# Patient Record
(demographics unavailable — no encounter records)

---

## 2025-01-08 NOTE — HISTORY OF PRESENT ILLNESS
[de-identified] : KACEY UMANZOR 73 year old male presents for evaluation of left knee OA he completed series of Gelsyn injection #3 in July did not help. He does take Motrin 200mg daily which feels does help but would like to discuss other option. pt did have right TKR currently doing well but wants conservative treatment with left knee at this time. He did have a Cortisone injection on right knee no help inquiries about PT.

## 2025-01-08 NOTE — DISCUSSION/SUMMARY
[de-identified] : Discussed at length the nature of the patient's condition. Their left knee symptoms appear secondary to degenerative arthritis. We reviewed operative and nonoperative treatment. While I believe he would eventually benefit from a TKR, we will avoid surgery at this time. Therefore, we will continue nonoperatively. The patient did decline injection because it gave no relief, therefore he will continue with Motrin. I have also suggested he continue PT and weight loss. They can continue activities as tolerated.  I will see him back in 3-4 months with x-rays.

## 2025-01-08 NOTE — PHYSICAL EXAM
[de-identified] : General appearance: well nourished and hydrated, pleasant, alert and oriented x 3, cooperative. HEENT: Normocephalic, EOM intact, Nasal septum midline, Oral cavity clear, External auditory canal clear. Cardiovascular: no apparent abnormalities, no lower leg edema, no varicosities, pedal pulses are palpable. Lymphatics Lymph nodes: none palpated, Lymphedema: not present. Neurologic: sensation is normal, no muscle weakness in upper or lower extremities, patella tendon reflexes intact . Dermatologic no apparent skin lesions, moist, warm, no rash. Spine:cervical spine appears normal and moves freely, thoracic spine appears normal and moves freely, lumbosacral spine appears normal and moves freely. Gait: nonantalgic.  Left Knee Inspection: trace effusion Wounds: none. Alignment: 5 degrees varus, 5 degree flexion contracture  Palpation: medial tenderness on palpation. ROM: Active (in degrees): 5-130 with crepitus and discomfort Ligamentous laxity (neg): all ligaments appear stable, negative ant. drawer test, negative post. drawer test, stable to varus stress test, stable to valgus stress test, negative Lachman's test, negative pivot shift test, Meniscal Test: negative McMurrays, negative Stephen. Patellofemoral Alignment Test: Q angle-, normal. Muscle Test: good quad strength. Leg examination: calf is soft and non-tender.	  [de-identified] : Left knee x-rays, standing AP/Lateral and Merchant films, and 45-degree PA standing view, taken at the office today shows diffuse tricompartmental degenerative arthritis, mild varus deformity, medial joint space narrowing, medial bone on bone sclerosis, patellofemoral joint space narrowing, small osteophytes, Kellgren and Hermilo grade 4   Right knee x-ray merchant view taken at the office today demonstrates a total knee replacement with the patella in a central position.

## 2025-01-08 NOTE — ADDENDUM
[FreeTextEntry1] : This note was written by Anant Zambrano on 01/08/2025 acting as scribe for Dr. Juan Joyce M.D.   I, Dr. Juan Joyce, have read and attest that all the information, medical decision making and discharge instructions within are true and accurate.

## 2025-01-31 NOTE — LETTER BODY
[FreeTextEntry2] : Kaley Jean MD [address] [place] [id number] [FreeTextEntry3] : Sincerely,      Isai Chavez MD, FACS Director of Urology Services, [facility] Chief of Urology, [facility]  of Urology   [facility], [place] [id number] P: [phone] F: [phone] [facility]

## 2025-01-31 NOTE — LETTER BODY
[FreeTextEntry2] : Kaley Jean MD [address] [place] [id number] [FreeTextEntry3] : Sincerely,      Isai Chavez MD, FACS Director of Urology Services, [facility] Chief of Urology, [facility]  of Urology   [facility], [place] [id number] P: [phone] F: [phone] [facility]

## 2025-01-31 NOTE — HISTORY OF PRESENT ILLNESS
[FreeTextEntry1] : Matias Long returns to the office for follow-up on BPH with associated lower urinary tract symptoms.  Treatment thus far has included finasteride and tamsulosin dual therapy.  He has gotten very good results with this combination therapy.  He reports no symptomatic progression and remains with good urinary patterns without any issues of urgency or frequency.  No incontinence.  No significant nocturia.  PSA level in February 2023 was 0.9 ng/mL (on finasteride).

## 2025-01-31 NOTE — LETTER BODY
[FreeTextEntry2] : Kaley Jean MD [address] [place] [id number] [FreeTextEntry3] : Sincerely,      Isai Chavez MD, FACS Director of Urology Services, [facility] Chief of Urology, [facility]  of Urology   [facility], [place] [id number] P: [phone] F: [phone] [facility]

## 2025-01-31 NOTE — ASSESSMENT
[FreeTextEntry1] : The PSA level remains low.  While in the context of finasteride, even taking this into account, there is no concern at this point regarding prostate cancer risk I do not think he needs to undergo prostate MRI imaging or biopsy.  He has continued to get excellent results with combination therapy with a 5 alpha reductase inhibitor and alpha-blocker.  These will be continued and his prescriptions have been renewed.  Follow-up again planned in 1 year.